# Patient Record
Sex: MALE | Race: OTHER | HISPANIC OR LATINO | Employment: FULL TIME | URBAN - METROPOLITAN AREA
[De-identification: names, ages, dates, MRNs, and addresses within clinical notes are randomized per-mention and may not be internally consistent; named-entity substitution may affect disease eponyms.]

---

## 2024-08-11 ENCOUNTER — HOSPITAL ENCOUNTER (EMERGENCY)
Facility: HOSPITAL | Age: 39
Discharge: HOME | End: 2024-08-11
Attending: EMERGENCY MEDICINE

## 2024-08-11 VITALS
OXYGEN SATURATION: 97 % | BODY MASS INDEX: 24.81 KG/M2 | HEART RATE: 72 BPM | HEIGHT: 70 IN | WEIGHT: 173.31 LBS | RESPIRATION RATE: 18 BRPM | DIASTOLIC BLOOD PRESSURE: 74 MMHG | SYSTOLIC BLOOD PRESSURE: 125 MMHG | TEMPERATURE: 99 F

## 2024-08-11 DIAGNOSIS — G43.909 MIGRAINE WITHOUT STATUS MIGRAINOSUS, NOT INTRACTABLE, UNSPECIFIED MIGRAINE TYPE: Primary | ICD-10-CM

## 2024-08-11 LAB
CTP QC/QA: YES
CTP QC/QA: YES
MOLECULAR STREP A: NEGATIVE
POCT GLUCOSE: 160 MG/DL (ref 70–110)
SARS-COV-2 RDRP RESP QL NAA+PROBE: NEGATIVE

## 2024-08-11 PROCEDURE — 82962 GLUCOSE BLOOD TEST: CPT

## 2024-08-11 PROCEDURE — 63600175 PHARM REV CODE 636 W HCPCS

## 2024-08-11 PROCEDURE — 87651 STREP A DNA AMP PROBE: CPT

## 2024-08-11 PROCEDURE — 87635 SARS-COV-2 COVID-19 AMP PRB: CPT

## 2024-08-11 PROCEDURE — 96372 THER/PROPH/DIAG INJ SC/IM: CPT

## 2024-08-11 PROCEDURE — 99285 EMERGENCY DEPT VISIT HI MDM: CPT | Mod: 25

## 2024-08-11 RX ORDER — IBUPROFEN 600 MG/1
600 TABLET ORAL EVERY 6 HOURS PRN
Qty: 20 TABLET | Refills: 0 | Status: SHIPPED | OUTPATIENT
Start: 2024-08-11

## 2024-08-11 RX ORDER — BUTALBITAL, ACETAMINOPHEN AND CAFFEINE 50; 325; 40 MG/1; MG/1; MG/1
1 TABLET ORAL EVERY 4 HOURS PRN
Qty: 15 TABLET | Refills: 0 | Status: SHIPPED | OUTPATIENT
Start: 2024-08-11 | End: 2024-09-10

## 2024-08-11 RX ORDER — KETOROLAC TROMETHAMINE 30 MG/ML
30 INJECTION, SOLUTION INTRAMUSCULAR; INTRAVENOUS
Status: COMPLETED | OUTPATIENT
Start: 2024-08-11 | End: 2024-08-11

## 2024-08-11 RX ADMIN — KETOROLAC TROMETHAMINE 30 MG: 30 INJECTION, SOLUTION INTRAMUSCULAR at 05:08

## 2024-08-11 NOTE — ED PROVIDER NOTES
Encounter Date: 8/11/2024    SCRIBE #1 NOTE: I, Srinajma Layne, am scribing for, and in the presence of,  Tomi Moss PA-C.       History     Chief Complaint   Patient presents with    Headache     HA for 2 days, fatigue, body aches, nausea without vomiting.  in triage.      Shlomo Fairbanks is a 38 y.o. male, with a PMHx of DM, who presents to the ED with a severe headache that began yesterday while working out. Patient reports he was doing arm exercises when he suddenly began with a 10/10 posterior headache that radiates behind his eyes. Reports pain has progressively gotten better since yesterday. Reports attempted Tx with 1000mg tylenol at 0900 today w/ some mild relief. Reports he has been fatigued all day today and was nauseous earlier today. Reports he works long hours outside. Denies Hx of similar headaches.  No other exacerbating or alleviating factors. Denies visual disturbances, vomiting, abdominal pain, numbness, weakness, cough, congestion, rhinorrhea or other associated symptoms.       The history is provided by the patient. No  was used.     Review of patient's allergies indicates:  No Known Allergies  History reviewed. No pertinent past medical history.  History reviewed. No pertinent surgical history.  No family history on file.  Social History     Tobacco Use    Smoking status: Never    Smokeless tobacco: Never   Substance Use Topics    Alcohol use: Yes    Drug use: Never     Review of Systems   Constitutional:  Positive for fatigue. Negative for activity change, chills, diaphoresis and fever.   HENT:  Negative for congestion, drooling, ear pain, rhinorrhea, sneezing, sore throat and trouble swallowing.    Eyes:  Negative for pain and visual disturbance.   Respiratory:  Negative for cough, chest tightness, shortness of breath, wheezing and stridor.    Cardiovascular:  Negative for chest pain, palpitations and leg swelling.   Gastrointestinal:  Positive for  nausea. Negative for abdominal distention, abdominal pain, constipation, diarrhea and vomiting.   Genitourinary:  Negative for difficulty urinating, dysuria, frequency and urgency.   Musculoskeletal:  Negative for arthralgias, back pain, myalgias, neck pain and neck stiffness.   Skin:  Negative for pallor, rash and wound.   Neurological:  Positive for headaches. Negative for dizziness, syncope, weakness, light-headedness and numbness.   All other systems reviewed and are negative.      Physical Exam     Initial Vitals [08/11/24 1538]   BP Pulse Resp Temp SpO2   131/81 85 18 98.2 °F (36.8 °C) 98 %      MAP       --         Physical Exam    Nursing note and vitals reviewed.  Constitutional: He appears well-developed and well-nourished. He is not diaphoretic. He does not appear ill. No distress.   HENT:   Head: Normocephalic and atraumatic.   Right Ear: External ear normal.   Left Ear: External ear normal.   Nose: Nose normal.   Mouth/Throat: Uvula is midline and oropharynx is clear and moist.   Eyes: Conjunctivae and EOM are normal. Pupils are equal, round, and reactive to light. Right eye exhibits no discharge. Left eye exhibits no discharge. No scleral icterus.   Neck: Trachea normal. Neck supple.   Normal range of motion.   Full passive range of motion without pain.     Cardiovascular:  Normal rate, regular rhythm, normal heart sounds, intact distal pulses and normal pulses.     Exam reveals no distant heart sounds and no friction rub.       Pulmonary/Chest: Effort normal and breath sounds normal. No respiratory distress.   Abdominal: Abdomen is soft. Bowel sounds are normal. He exhibits no distension and no pulsatile midline mass. There is no abdominal tenderness.   No right CVA tenderness.  No left CVA tenderness. There is no rebound and no guarding.   Musculoskeletal:         General: Normal range of motion.      Right shoulder: Normal.      Left shoulder: Normal.      Right elbow: Normal.      Left elbow:  Normal.      Right wrist: Normal.      Left wrist: Normal.      Right hand: Normal.      Left hand: Normal.      Cervical back: Normal, full passive range of motion without pain, normal range of motion and neck supple. No edema, erythema or rigidity. No spinous process tenderness or muscular tenderness. Normal range of motion.      Thoracic back: Normal.      Lumbar back: Normal.      Right hip: Normal.      Left hip: Normal.      Right knee: Normal.      Left knee: Normal.      Right lower leg: Normal.      Left lower leg: Normal.      Right ankle: Normal.      Left ankle: Normal.      Right foot: Normal.      Left foot: Normal.     Neurological: He is alert and oriented to person, place, and time. He has normal strength. No cranial nerve deficit or sensory deficit. He displays a negative Romberg sign. Coordination and gait normal. GCS eye subscore is 4. GCS verbal subscore is 5. GCS motor subscore is 6.   He is awake, alert, and oriented x3. PERRL. EOM's Intact. Gross visual acuity is intact. No tongue deviation or slurred speech. Bilateral facial movement is symmetrical. Symmetrical shoulder shrug and head moves appropriately side to side. Sensation is intact and symmetric to face, upper, and lower extremities. Patient hears commands and appropriately responds. Strength is 5/5 UE/LE bilaterally. No truncal ataxia. Ambulates with a steady gait.  Normal finger-to-nose.  Cervical Nerve Exam Normal: Equal strength with upper extremities (thumbs up/down/side, fingers spread, wrist and elbow flexion/extension, arms crossed).      Skin: Skin is warm and dry. Capillary refill takes less than 2 seconds. No bruising, no ecchymosis and no rash noted. No erythema.   Psychiatric: He has a normal mood and affect. His speech is normal and behavior is normal. Thought content normal.         ED Course   Procedures  Labs Reviewed   POCT GLUCOSE - Abnormal       Result Value    POCT Glucose 160 (*)    SARS-COV-2 RDRP GENE    POC  Rapid COVID Negative       Acceptable Yes     POCT STREP A MOLECULAR          Imaging Results              CT Head Without Contrast (Final result)  Result time 08/11/24 16:50:00      Final result by Thierry Bui MD (08/11/24 16:50:00)                   Impression:      No acute intracranial process.      Electronically signed by: Thierry Bui  Date:    08/11/2024  Time:    16:50               Narrative:    EXAMINATION:  CT HEAD WITHOUT CONTRAST    CLINICAL HISTORY:  Headache, sudden, severe;    TECHNIQUE:  Low dose axial CT images obtained throughout the head without intravenous contrast. Sagittal and coronal reconstructions were performed.    COMPARISON:  None.    FINDINGS:  Intracranial compartment:    Ventricles and sulci are normal in size for age without evidence of hydrocephalus. No extra-axial blood or fluid collections.    The brain parenchyma appears normal. No parenchymal mass, hemorrhage, edema or major vascular distribution infarct.    Skull/extracranial contents (limited evaluation): No fracture. Mastoid air cells and paranasal sinuses are essentially clear.                                       Medications   ketorolac injection 30 mg (30 mg Intramuscular Given 8/11/24 1709)     Medical Decision Making  Shlomo Fairbanks is a 38 y.o. male, with a PMHx of DM, who presents to the ED with a severe headache that began yesterday while working out. Patient reports he was doing arm exercises when he suddenly began with a 10/10 posterior headache that radiates behind his eyes. Reports pain has progressively gotten better since yesterday. Reports attempted Tx with 1000mg tylenol at 0900 today w/ some mild relief. Reports he has been fatigued all day today and was nauseous earlier today. Reports he works long hours outside. Denies Hx of similar headaches.  No other exacerbating or alleviating factors. Denies visual disturbances, vomiting, abdominal pain, numbness, weakness, cough,  congestion, rhinorrhea or other associated symptoms.   Patient's chart and medical history reviewed.  Patient's vitals reviewed.  They are afebrile, no respiratory distress, nontoxic-appearing in the ED.  Differential diagnosis is considered   - SAH, Epidural hematoma, Subdural hematoma: considered with patient having severe headache onset.   - Meningitis: considered with headache, although unlikely with no fever, neck stiffness, negative Brudzinski  - Pseudotumor Cerebri: considered with headache, although unlikely without risk factors. Will re-evaluate headache after medications.  - Migraine, Tension headache, Cluster Headache: considered with headache with photosensitivity   - GCA: considered with headache, although unlikely with no significant risk factors, no temporal tenderness  CT head ordered for evaluation due to severe headache. More per ED course. Patient given Toradol for pain. Plan to dc home with NSAIDs and fioricet if headache returns. Patient currently endorses headache starting to resolve at 3/10.    At this time I'll discharge home to follow up with primary care physician in the next 1-2 days for further evaluation.  If the pain continues the pt will need to see Neurology for further evaluation.  The patient is comfortable with this plan and comfortable going home at this time. After taking into careful account the historical factors and physical exam findings of the patient's presentation today, in conjunction with the empirical and objective data obtained on ED workup, no acute emergent medical condition has been identified. The patient appears to be low risk for an emergent medical condition and I feel it is safe and appropriate at this time for the patient to be discharged to follow-up as detailed in their discharge instructions for reevaluation and possible continued outpatient workup and management. I have discussed the specifics of the workup with the patient and the patient has verbalized  understanding of the details of the workup, the diagnosis, the treatment plan, and the need for outpatient follow-up.  Although the patient has no emergent etiology today this does not preclude the development of an emergent condition so in addition, I have advised the patient that they can return to the ED and/or activate EMS at any time with worsening of their symptoms, change of their symptoms, or with any other medical complaint.  The patient remained comfortable and stable during their visit in the ED.  Discharge and follow-up instructions discussed with the patient who expressed understanding and willingness to comply with my recommendations. I discussed with the patient/family the diagnosis, treatment plan, indications for return to the emergency department, and for expected follow-up. Please follow up with your primary doctor in 1-2 days and return to the ED in any new, worsening, or continued symptoms. The patient/family verbalized an understanding. The patient/family is asked if there are any questions or concerns. We discuss the case, until all issues are addressed to the patient/family's satisfaction. Patient/family understands and is agreeable to the plan.   DANIA SILVA PA-C    DISCLAIMER: This note was prepared with [employer] voice recognition transcription software. Garbled syntax, mangled pronouns, and other bizarre constructions may be attributed to that software system.      Amount and/or Complexity of Data Reviewed  Labs: ordered.  Radiology: ordered and independent interpretation performed.    Risk  Prescription drug management.            Scribe Attestation:   Scribe #1: I performed the above scribed service and the documentation accurately describes the services I performed. I attest to the accuracy of the note.        ED Course as of 08/11/24 1713   Sun Aug 11, 2024   1701 No intracranial abnormalities on CT. [AF]   1703 SpO2: 98 % [AF]   1703 Pulse: 85 [AF]   1703 Temp: 98.2 °F (36.8 °C)  [AF]   1703 BP: 131/81 [AF]   1703 POCT Glucose(!): 160 [AF]   1703 SARS-CoV-2 RNA, Amplification, Qual: Negative [AF]      ED Course User Index  [AF] Tomi Moss PA-C I, Alain David Flexer, PA-C, personally performed the services described in this documentation. All medical record entries made by the scribe were at my direction and in my presence. I have reviewed the chart and agree that the record reflects my personal performance and is accurate and complete.      DISCLAIMER: This note was prepared with [employer] voice recognition transcription software. Garbled syntax, mangled pronouns, and other bizarre constructions may be attributed to that software system.        Clinical Impression:  Final diagnoses:  [G43.909] Migraine without status migrainosus, not intractable, unspecified migraine type (Primary)          ED Disposition Condition    Discharge Stable          ED Prescriptions       Medication Sig Dispense Start Date End Date Auth. Provider    butalbital-acetaminophen-caffeine -40 mg (FIORICET, ESGIC) -40 mg per tablet Take 1 tablet by mouth every 4 (four) hours as needed. 15 tablet 8/11/2024 9/10/2024 Tomi Moss PA-C    ibuprofen (ADVIL,MOTRIN) 600 MG tablet Take 1 tablet (600 mg total) by mouth every 6 (six) hours as needed for Pain. 20 tablet 8/11/2024 -- Tomi Moss PA-C          Follow-up Information       Follow up With Specialties Details Why Contact Info    [facility] -  Schedule an appointment as soon as possible for a visit in 1 day for follow up if you do not currently have a  [address]  [place] [address]  [phone]      Your primary care physician  Schedule an appointment as soon as possible for a visit in 1 day for follow up     [facility]UROLOGY Neurology Schedule an appointment as soon as possible for a visit  If symptoms worsen, for follow up [address] [facility]  [place]  [place] [address]  [phone]             Tomi Moss PA-C  08/11/24 1712

## 2024-08-11 NOTE — DISCHARGE INSTRUCTIONS
Thank you for coming to our Emergency Department today. It is important to remember that some problems or medical conditions are difficult to diagnose and may not be found or addressed during your Emergency Department visit.  These conditions often start with non-specific symptoms and can only be diagnosed on follow up visits with your primary care physician or specialist when the symptoms continue or change. Please remember that all medical conditions can change, and we cannot predict how you will be feeling tomorrow or the next day. Return to the ER with any questions/concerns, new/concerning symptoms including fever, chest pain, shortness of breath, loss of consciousness, dizziness, weakness, worsening symptoms, failure to improve, or any other concerns. Also, please follow up with your Primary Care Physician and/or Pediatrician in the next 1-2 days to review your ED visit in entirety and for re-evaluation.   Be sure to follow up with your primary care doctor and review all labs/imaging/tests that were performed during your ER visit with them. It is very common for us to identify non-emergent incidental findings which must be followed up with your primary care physician.  Some labs/imaging/tests may be outside of the normal range, and require non-emergent follow-up and/or further investigation/treatment/procedures/testing to help diagnose/exclude/prevent complications or other potentially serious medical conditions. Some abnormalities may not have been discussed or addressed during your ER visit. Some lab results may not return during your ER visit but can be accessible by downloading the free [facility] susan or by visiting [email or web address] . It is important for you to review all labs/imaging/tests which are outside of the normal range with your physician.  An ER visit does not replace a primary care visit, and many screening tests or follow-up tests cannot be ordered by an ER doctor or performed by  the ER. Some tests may even require pre-approval.  If you do not have a primary care doctor, you may contact the one listed on your discharge paperwork or you may also call the [facility] Appointment Desk at [phone] , or [address] [place] [address] at  [phone] to schedule an appointment, or establish care with a primary care doctor or even a specialist and to obtain information about local resources. It is important to your health that you have a primary care doctor.  Please take all medications as directed. We have done our best to select a medication for you that will treat your condition however, all medications may potentially have side-effects and it is impossible to predict which medications may give you side-effects or what those side-effects (if any) those medications may give you.  If you feel that you are having a negative effect or side-effect of any medication you should stop taking those medications immediately and seek medical attention. If you feel that you are having a life-threatening reaction call 911.  Do not drive, swim, climb to height, take a bath, operate heavy machinery, drink alcohol or take potentially sedating medications, sign any legal documents or make any important decisions for 24 hours if you have received any pain medications, sedatives or mood altering drugs during your ER visit or within 24 hours of taking them if they have been prescribed to you.   You can find additional resources for Dentists, hearing aids, durable medical equipment, low cost pharmacies and other resources at [email or web address]  Patient agrees with this plan. Discussed with her strict return precautions, they verbalized understanding. Patient is stable for discharge.   § Please take all medication as prescribed.

## 2024-08-11 NOTE — ED TRIAGE NOTES
Pt presents to ER with complaints of HA times 2 days. Pt rates pain 3/10 and says he took Tylenol today. Pt admits to nausea but denies vomiting. Pt reports fatigue as well. Pt denies any other issues at this time.

## 2024-09-04 ENCOUNTER — HOSPITAL ENCOUNTER (EMERGENCY)
Facility: HOSPITAL | Age: 39
Discharge: HOME | End: 2024-09-04
Attending: EMERGENCY MEDICINE

## 2024-09-04 VITALS
TEMPERATURE: 98 F | BODY MASS INDEX: 24.05 KG/M2 | SYSTOLIC BLOOD PRESSURE: 115 MMHG | WEIGHT: 168 LBS | OXYGEN SATURATION: 100 % | HEART RATE: 65 BPM | DIASTOLIC BLOOD PRESSURE: 61 MMHG | HEIGHT: 70 IN | RESPIRATION RATE: 18 BRPM

## 2024-09-04 DIAGNOSIS — R73.9 ELEVATED BLOOD SUGAR: Primary | ICD-10-CM

## 2024-09-04 DIAGNOSIS — F41.9 ANXIETY: ICD-10-CM

## 2024-09-04 DIAGNOSIS — H93.8X1 EAR FULLNESS, RIGHT: ICD-10-CM

## 2024-09-04 LAB
ALBUMIN SERPL BCP-MCNC: 4.2 G/DL (ref 3.5–5.2)
ALP SERPL-CCNC: 75 U/L (ref 55–135)
ALT SERPL W/O P-5'-P-CCNC: 11 U/L (ref 10–44)
ANION GAP SERPL CALC-SCNC: 7 MMOL/L (ref 8–16)
AST SERPL-CCNC: 24 U/L (ref 10–40)
B-OH-BUTYR BLD STRIP-SCNC: 0.2 MMOL/L (ref 0–0.5)
BACTERIA #/AREA URNS HPF: NORMAL /HPF
BASOPHILS # BLD AUTO: 0.05 K/UL (ref 0–0.2)
BASOPHILS NFR BLD: 0.6 % (ref 0–1.9)
BILIRUB SERPL-MCNC: 0.4 MG/DL (ref 0.1–1)
BILIRUB UR QL STRIP: NEGATIVE
BUN SERPL-MCNC: 19 MG/DL (ref 6–20)
CALCIUM SERPL-MCNC: 9.2 MG/DL (ref 8.7–10.5)
CHLORIDE SERPL-SCNC: 105 MMOL/L (ref 95–110)
CLARITY UR: CLEAR
CO2 SERPL-SCNC: 25 MMOL/L (ref 23–29)
COLOR UR: YELLOW
CREAT SERPL-MCNC: 1 MG/DL (ref 0.5–1.4)
DIFFERENTIAL METHOD BLD: ABNORMAL
EOSINOPHIL # BLD AUTO: 0.3 K/UL (ref 0–0.5)
EOSINOPHIL NFR BLD: 4.1 % (ref 0–8)
ERYTHROCYTE [DISTWIDTH] IN BLOOD BY AUTOMATED COUNT: 12.5 % (ref 11.5–14.5)
EST. GFR  (NO RACE VARIABLE): >60 ML/MIN/1.73 M^2
GLUCOSE SERPL-MCNC: 135 MG/DL (ref 70–110)
GLUCOSE SERPL-MCNC: 206 MG/DL (ref 70–110)
GLUCOSE UR QL STRIP: ABNORMAL
HCT VFR BLD AUTO: 44.8 % (ref 40–54)
HGB BLD-MCNC: 15.6 G/DL (ref 14–18)
HGB UR QL STRIP: NEGATIVE
IMM GRANULOCYTES # BLD AUTO: 0.01 K/UL (ref 0–0.04)
IMM GRANULOCYTES NFR BLD AUTO: 0.1 % (ref 0–0.5)
KETONES UR QL STRIP: NEGATIVE
LEUKOCYTE ESTERASE UR QL STRIP: NEGATIVE
LYMPHOCYTES # BLD AUTO: 2.6 K/UL (ref 1–4.8)
LYMPHOCYTES NFR BLD: 32 % (ref 18–48)
MAGNESIUM SERPL-MCNC: 1.9 MG/DL (ref 1.6–2.6)
MCH RBC QN AUTO: 31.6 PG (ref 27–31)
MCHC RBC AUTO-ENTMCNC: 34.8 G/DL (ref 32–36)
MCV RBC AUTO: 91 FL (ref 82–98)
MICROSCOPIC COMMENT: NORMAL
MONOCYTES # BLD AUTO: 0.6 K/UL (ref 0.3–1)
MONOCYTES NFR BLD: 7.7 % (ref 4–15)
NEUTROPHILS # BLD AUTO: 4.6 K/UL (ref 1.8–7.7)
NEUTROPHILS NFR BLD: 55.5 % (ref 38–73)
NITRITE UR QL STRIP: NEGATIVE
NRBC BLD-RTO: 0 /100 WBC
PH UR STRIP: 6 [PH] (ref 5–8)
PLATELET # BLD AUTO: 217 K/UL (ref 150–450)
PMV BLD AUTO: 11.2 FL (ref 9.2–12.9)
POCT GLUCOSE: 135 MG/DL (ref 70–110)
POCT GLUCOSE: 255 MG/DL (ref 70–110)
POTASSIUM SERPL-SCNC: 4 MMOL/L (ref 3.5–5.1)
PROT SERPL-MCNC: 7.7 G/DL (ref 6–8.4)
PROT UR QL STRIP: NEGATIVE
RBC # BLD AUTO: 4.94 M/UL (ref 4.6–6.2)
RBC #/AREA URNS HPF: 0 /HPF (ref 0–4)
SODIUM SERPL-SCNC: 137 MMOL/L (ref 136–145)
SP GR UR STRIP: 1.01 (ref 1–1.03)
SQUAMOUS #/AREA URNS HPF: 0 /HPF
URN SPEC COLLECT METH UR: ABNORMAL
UROBILINOGEN UR STRIP-ACNC: NEGATIVE EU/DL
WBC # BLD AUTO: 8.23 K/UL (ref 3.9–12.7)
WBC #/AREA URNS HPF: 0 /HPF (ref 0–5)
YEAST URNS QL MICRO: NORMAL

## 2024-09-04 PROCEDURE — 82962 GLUCOSE BLOOD TEST: CPT

## 2024-09-04 PROCEDURE — 80053 COMPREHEN METABOLIC PANEL: CPT

## 2024-09-04 PROCEDURE — 25000003 PHARM REV CODE 250

## 2024-09-04 PROCEDURE — 85025 COMPLETE CBC W/AUTO DIFF WBC: CPT

## 2024-09-04 PROCEDURE — 82010 KETONE BODYS QUAN: CPT

## 2024-09-04 PROCEDURE — 99284 EMERGENCY DEPT VISIT MOD MDM: CPT | Mod: 25

## 2024-09-04 PROCEDURE — 81000 URINALYSIS NONAUTO W/SCOPE: CPT

## 2024-09-04 PROCEDURE — 83735 ASSAY OF MAGNESIUM: CPT

## 2024-09-04 PROCEDURE — 96360 HYDRATION IV INFUSION INIT: CPT

## 2024-09-04 RX ORDER — FLUTICASONE PROPIONATE 50 MCG
1 SPRAY, SUSPENSION (ML) NASAL 2 TIMES DAILY PRN
Qty: 15 G | Refills: 0 | Status: SHIPPED | OUTPATIENT
Start: 2024-09-04

## 2024-09-04 RX ORDER — CETIRIZINE HYDROCHLORIDE 10 MG/1
10 TABLET ORAL DAILY
Qty: 30 TABLET | Refills: 0 | Status: SHIPPED | OUTPATIENT
Start: 2024-09-04 | End: 2024-10-04

## 2024-09-04 RX ORDER — LANCETS
EACH MISCELLANEOUS
Qty: 100 EACH | Refills: 0 | Status: SHIPPED | OUTPATIENT
Start: 2024-09-04

## 2024-09-04 RX ORDER — INSULIN PUMP SYRINGE, 3 ML
EACH MISCELLANEOUS
Qty: 1 EACH | Refills: 0 | Status: SHIPPED | OUTPATIENT
Start: 2024-09-04 | End: 2025-09-04

## 2024-09-04 RX ADMIN — SODIUM CHLORIDE 1000 ML: 9 INJECTION, SOLUTION INTRAVENOUS at 03:09

## 2024-09-04 NOTE — Clinical Note
"Shlomo"Shlomo"SaimaFairbanks was seen and treated in our emergency department on 9/4/2024.  He may return to work on 09/05/2024.       If you have any questions or concerns, please don't hesitate to call.      Debora Aguirre PA-C"

## 2024-09-04 NOTE — DISCHARGE INSTRUCTIONS
Continue to monitor your blood sugars.  Be sure getting adequate rest and hydration.  Follow up with your PCP for further evaluation management of her symptoms.    Thank you for coming to our Emergency Department today. It is important to remember that some problems or medical conditions are difficult to diagnose and may not be found or addressed during your Emergency Department visit.  These conditions often start with non-specific symptoms and can only be diagnosed on follow up visits with your primary care physician or specialist when the symptoms continue or change. Please remember that all medical conditions can change, and we cannot predict how you will be feeling tomorrow or the next day. Return to the ER with any questions/concerns, new/concerning symptoms, worsening or failure to improve.     Please return to ER if you experience severe dizziness, fever higher then 100.4 that persist after medication administration, uncontrolled nausea/vomiting or diarrhea or any other major concern like increased pain, chest pain, shortness of breath, inability to pass stool or gas, or difficulty breathing or swelling of the throat/mouth/tongue.    Be sure to follow up with your primary care doctor and review all labs/imaging/tests that were performed during your ER visit with them. It is very common for us to identify non-emergent incidental findings which must be followed up with your primary care physician.  Some labs/imaging/tests may be outside of the normal range, and require non-emergent follow-up and/or further investigation/treatment/procedures/testing to help diagnose/exclude/prevent complications or other potentially serious medical conditions. Some abnormalities may not have been discussed or addressed during your ER visit.     An ER visit does not replace a primary care visit, and many screening tests or follow-up tests cannot be ordered by an ER doctor or performed by the ER. Some tests may even require  pre-approval.    If you do not have a primary care doctor, you may contact the one listed on your discharge paperwork or you may also call the [facility] Appointment Desk at [phone] , or [address] [place] [address] at  [phone] to schedule an appointment, or establish care with a primary care doctor or even a specialist and to obtain information about local resources. It is important to your health that you have a primary care doctor.    Please take all medications as directed. We have done our best to select a medication for you that will treat your condition however, all medications may potentially have side-effects and it is impossible to predict which medications may give you side-effects or what those side-effects (if any) those medications may give you.  If you feel that you are having a negative effect or side-effect of any medication you should stop taking those medications immediately and seek medical attention. If you feel that you are having a life-threatening reaction call 911.      Do not drive, swim, climb to height, take a bath, operate heavy machinery, drink alcohol or take potentially sedating medications, sign any legal documents or make any important decisions for 24 hours if you have received any pain medications, sedatives or mood altering drugs during your ER visit or within 24 hours of taking them if they have been prescribed to you.     You can find additional resources for Dentists, hearing aids, durable medical equipment, low cost pharmacies and other resources at [email or web address]

## 2024-09-05 NOTE — ED PROVIDER NOTES
Encounter Date: 9/4/2024       History     Chief Complaint   Patient presents with    ear muffled    Shaking     Pt presents to ER with complaints of feeling shakey and his right ear is muffled since 0900. Pt states he is diabetic and says he took 2 metformin earlier. Pt states he just doesn't feel well. Pt denies any other issues at this time. Blood glucose 255     38 y.o. male with PMHx of DM, presents for emergent evaluation of intermittent right ear pain X  month worsened today now reporting muffled sensation.  He also reports feeling shaky and that his blood sugar was slightly elevated. Patient works outside as a  in the heat and was not feeling his normal self and overall weak and shaky.  He has a dexcom in place to upper arm which was reading his blood sugar to be 240 so he took 2 metformin pills.  He states that he is on weekly Ozempic which he takes as prescribed and is told to take one metformin p.r.n. for blood sugars >180.  He took 2 because he was really not feeling well.  He then went to the health office where his glucose was checked with fingerstick and noted to be 180s.  He it was lunch which consisted of 2 hot dogs and shakiness improved but his ear pain persisted prompting her to report to the ER.  He states that he is from [place] where his PCP resides in his here short term for work.  His last blood work was completed in May by his PCP.  He has not had his Dexcom interrogated in awhile so he believes it could be an accurate as well. He reports that he manages his blood sugars well and follows a diet very strictly eating low car blood sugar frequent meals and stays very hydrated throughout the day.  He has not attempted any treatment for his ear.  Denies any URI symptoms, fever/chills, headache, chest pain, shortness of breath, abdominal pain, nausea/vomiting/diarrhea, urinary concerns, myalgias, or any other complaints at this time.     The history is provided by the patient.      Review of patient's allergies indicates:  No Known Allergies  History reviewed. No pertinent past medical history.  History reviewed. No pertinent surgical history.  No family history on file.  Social History     Tobacco Use    Smoking status: Never    Smokeless tobacco: Never   Substance Use Topics    Alcohol use: Yes    Drug use: Never     Review of Systems   Constitutional:  Negative for chills and fever.   HENT:  Positive for ear pain. Negative for congestion, ear discharge, postnasal drip, rhinorrhea and sore throat.    Respiratory:  Negative for shortness of breath.    Cardiovascular:  Negative for chest pain.   Gastrointestinal:  Negative for abdominal pain, diarrhea, nausea and vomiting.   Genitourinary:  Negative for decreased urine volume, dysuria, hematuria and testicular pain.   Musculoskeletal:  Negative for myalgias.   Skin:  Negative for rash.   Neurological:  Positive for weakness. Negative for syncope, light-headedness and headaches.   Psychiatric/Behavioral: Negative.         Physical Exam     Initial Vitals [09/04/24 1427]   BP Pulse Resp Temp SpO2   130/76 77 18 98.1 °F (36.7 °C) 98 %      MAP       --         Physical Exam    Nursing note and vitals reviewed.  Constitutional: He appears well-developed and well-nourished. He is not diaphoretic. No distress.   HENT:   Head: Normocephalic and atraumatic.   Right Ear: External ear normal.   Left Ear: External ear normal.   Mouth/Throat: Oropharynx is clear and moist.   Eyes: Conjunctivae and EOM are normal. No scleral icterus.   Neck: Neck supple.   Normal range of motion.  Cardiovascular:  Normal rate and regular rhythm.           Pulmonary/Chest: No stridor. No respiratory distress.   Abdominal: Abdomen is soft. Bowel sounds are normal. He exhibits no distension. There is no abdominal tenderness. There is no rebound.   Musculoskeletal:         General: Normal range of motion.      Cervical back: Normal range of motion and neck supple.      Neurological: He is alert and oriented to person, place, and time. He has normal strength.   Skin: No rash noted.   Psychiatric: He has a normal mood and affect. Thought content normal.         ED Course   Procedures  Labs Reviewed   URINALYSIS, REFLEX TO URINE CULTURE - Abnormal       Result Value    Specimen UA Urine, Clean Catch      Color, UA Yellow      Appearance, UA Clear      pH, UA 6.0      Specific Gravity, UA 1.015      Protein, UA Negative      Glucose, UA 3+ (*)     Ketones, UA Negative      Bilirubin (UA) Negative      Occult Blood UA Negative      Nitrite, UA Negative      Urobilinogen, UA Negative      Leukocytes, UA Negative      Narrative:     Specimen Source->Urine   CBC W/ AUTO DIFFERENTIAL - Abnormal    WBC 8.23      RBC 4.94      Hemoglobin 15.6      Hematocrit 44.8      MCV 91      MCH 31.6 (*)     MCHC 34.8      RDW 12.5      Platelets 217      MPV 11.2      Immature Granulocytes 0.1      Gran # (ANC) 4.6      Immature Grans (Abs) 0.01      Lymph # 2.6      Mono # 0.6      Eos # 0.3      Baso # 0.05      nRBC 0      Gran % 55.5      Lymph % 32.0      Mono % 7.7      Eosinophil % 4.1      Basophil % 0.6      Differential Method Automated     COMPREHENSIVE METABOLIC PANEL - Abnormal    Sodium 137      Potassium 4.0      Chloride 105      CO2 25      Glucose 206 (*)     BUN 19      Creatinine 1.0      Calcium 9.2      Total Protein 7.7      Albumin 4.2      Total Bilirubin 0.4      Alkaline Phosphatase 75      AST 24      ALT 11      eGFR >60      Anion Gap 7 (*)    POCT GLUCOSE - Abnormal    POCT Glucose 255 (*)    POCT GLUCOSE - Abnormal    POCT Glucose 135 (*)    MAGNESIUM    Magnesium 1.9     BETA - HYDROXYBUTYRATE, SERUM    Beta-Hydroxybutyrate 0.2     URINALYSIS MICROSCOPIC    RBC, UA 0      WBC, UA 0      Bacteria None      Yeast, UA None      Squam Epithel, UA 0      Microscopic Comment SEE COMMENT      Narrative:     Specimen Source->Urine          Imaging Results    None           Medications   sodium chloride 0.9% bolus 1,000 mL 1,000 mL (0 mLs Intravenous Stopped 9/4/24 8940)     Medical Decision Making  This is an evaluation of a 38 y.o. male with PMH DM that presents to the Emergency Department for right ear pain and concern for elevated blood sugar. The patient is a non-toxic, afebrile, and well appearing male. On physical exam ears and pharynx are without evidence of infection. Appears well hydrated with moist mucus membranes. Neck soft and supple with no meningeal signs or cervical lymphadenopathy. Breath sounds are clear and equal bilaterally with no adventitious breath sounds, tachypnea or respiratory distress with room air pulse ox of 98% and no evidence of hypoxia.     Vital Signs Are Reassuring.  RESULTS: POCT glucose 255. UA with no evidence of infection, protein or ketones. CBC without leukocytosis.  Hemoglobin and hematocrit stable. CMP unremarkable without significant electrolyte derangement, impaired renal function, or elevated LFTs.  Beta hydroxybutyrate within normal limits.  Given 1L NS.  Repeat glucose 135.     My overall impression is acute serous OM.  Advised over-the-counter medications and decongestants for symptomatic care. I considered, but at this time, do not suspect OM, OE, PTA, strep pharyngitis, retropharyngeal abscess, isidro angina, meningitis, pneumonia, or acute bacterial sinusitis.   All findings were reviewed with the patient/family in detail.  Although the patient is experiencing some hyperglycemia, I see no indication of diabetic ketoacidosis or hyperosmotic hyperkeratotic syndrome.  Patient is feeling well at this time  I see no indication of an emergent process beyond that addressed during our encounter but have duly counseled the patient/family regarding the need for prompt follow-up as well as the indications that should prompt immediate return to the emergency room should new or worrisome developments occur.  His Dexcom could be inaccurate.  Will  write orders for fingerstick glucose monitor.  Advised to continue all prescribed medications and follow up with his PCP.  Emphasized importance of hydration as well as appropriate diabetic diet.     The diagnosis, treatment plan, instructions for follow-up and reevaluation with PCP as well as ED return precautions were discussed and understanding was verbalized. All questions or concerns have been addressed.     Amount and/or Complexity of Data Reviewed  Labs: ordered. Decision-making details documented in ED Course.    Risk  OTC drugs.  Diagnosis or treatment significantly limited by social determinants of health.               ED Course as of 09/05/24 1250   Wed Sep 04, 2024   1708 Urinalysis Microscopic  UA is negative for infection, no nitrites, leukocytes, blood, or protein present. [CC]   1708 Magnesium : 1.9 [CC]   1708 Beta-Hydroxybutyrate: 0.2 [CC]   1708 CBC auto differential(!) [CC]   1708 Comprehensive metabolic panel(!) [CC]   1708 CBC without leukocytosis.  Hemoglobin and hematocrit stable. CMP unremarkable without significant electrolyte derangement, impaired renal function, or elevated LFTs  [CC]      ED Course User Index  [CC] Debora Aguirre PA-C                           Clinical Impression:  Final diagnoses:  [R73.9] Elevated blood sugar (Primary)  [H93.8X1] Ear fullness, right  [F41.9] Anxiety          ED Disposition Condition    Discharge Stable          ED Prescriptions       Medication Sig Dispense Start Date End Date Auth. Provider    blood-glucose meter kit To check BG 3 times daily, to use with insurance preferred meter 1 each 9/4/2024 9/4/2025 Debora Aguirre PA-C    lancets Misc To check BG 3 times daily, to use with insurance preferred meter 100 each 9/4/2024 -- Debora Aguirre PA-C    blood sugar diagnostic Strp To check BG 3 times daily, to use with insurance preferred meter 100 each 9/4/2024 -- Debora Aguirre PA-C    fluticasone propionate (FLONASE) 50 mcg/actuation nasal  spray 1 spray (50 mcg total) by Each Nostril route 2 (two) times daily as needed for Rhinitis or Allergies. 15 g 9/4/2024 -- Debora Aguirre PA-C    cetirizine (ZYRTEC) 10 MG tablet Take 1 tablet (10 mg total) by mouth once daily. 30 tablet 9/4/2024 10/4/2024 Debora Aguirre PA-C          Follow-up Information       Follow up With Specialties Details Why Contact Info    [facility] Emergency Dept Emergency Medicine Go to  For new or worsening symptoms [address] [facility] [place] [id number] [phone]    [facility] -  Schedule an appointment as soon as possible for a visit   [address] [place] [address]  [phone]               Debora Aguirre PA-C  09/05/24 5239

## 2024-10-09 ENCOUNTER — HOSPITAL ENCOUNTER (EMERGENCY)
Facility: HOSPITAL | Age: 39
Discharge: HOME | End: 2024-10-09
Attending: STUDENT IN AN ORGANIZED HEALTH CARE EDUCATION/TRAINING PROGRAM

## 2024-10-09 VITALS
WEIGHT: 170 LBS | DIASTOLIC BLOOD PRESSURE: 85 MMHG | TEMPERATURE: 99 F | RESPIRATION RATE: 18 BRPM | BODY MASS INDEX: 24.39 KG/M2 | HEART RATE: 80 BPM | SYSTOLIC BLOOD PRESSURE: 145 MMHG | OXYGEN SATURATION: 98 %

## 2024-10-09 DIAGNOSIS — M25.569 KNEE PAIN: ICD-10-CM

## 2024-10-09 DIAGNOSIS — M25.469 KNEE SWELLING: Primary | ICD-10-CM

## 2024-10-09 PROCEDURE — 99283 EMERGENCY DEPT VISIT LOW MDM: CPT | Mod: 25

## 2024-10-09 PROCEDURE — 25000003 PHARM REV CODE 250: Performed by: PHYSICIAN ASSISTANT

## 2024-10-09 RX ORDER — ACETAMINOPHEN 500 MG
500 TABLET ORAL EVERY 4 HOURS PRN
Qty: 20 TABLET | Refills: 0 | Status: SHIPPED | OUTPATIENT
Start: 2024-10-09 | End: 2024-10-14

## 2024-10-09 RX ORDER — IBUPROFEN 600 MG/1
600 TABLET ORAL
Status: COMPLETED | OUTPATIENT
Start: 2024-10-09 | End: 2024-10-09

## 2024-10-09 RX ORDER — OXYCODONE AND ACETAMINOPHEN 5; 325 MG/1; MG/1
1 TABLET ORAL
Status: COMPLETED | OUTPATIENT
Start: 2024-10-09 | End: 2024-10-09

## 2024-10-09 RX ORDER — IBUPROFEN 600 MG/1
600 TABLET ORAL EVERY 6 HOURS PRN
Qty: 20 TABLET | Refills: 0 | Status: SHIPPED | OUTPATIENT
Start: 2024-10-09 | End: 2024-10-14

## 2024-10-09 RX ADMIN — IBUPROFEN 600 MG: 600 TABLET ORAL at 04:10

## 2024-10-09 RX ADMIN — OXYCODONE HYDROCHLORIDE AND ACETAMINOPHEN 1 TABLET: 5; 325 TABLET ORAL at 04:10

## 2024-10-09 NOTE — Clinical Note
"Shlomo Paredess"Magdi was seen and treated in our emergency department on 10/9/2024.  He may return to work on 10/12/2024.       If you have any questions or concerns, please don't hesitate to call.      Sonia Soto PA-C"

## 2024-10-09 NOTE — DISCHARGE INSTRUCTIONS

## 2024-10-09 NOTE — ED PROVIDER NOTES
Encounter Date: 10/9/2024    SCRIBE #1 NOTE: I, Manuelito Lilliam, am scribing for, and in the presence of,  Sonia Soto PA-C.       History     Chief Complaint   Patient presents with    Knee Pain     Pt c/o right knee pain x 10 days. Pt denies any injury or trauma. Swelling noted to right knee. Pt does go to the gym      CC: Knee pain    HPI:  38 y.o. male with PMHx of DM, presents to the ED for evaluation of persistent R knee pain and swelling x 2 weeks. States that symptoms are exacerbated with walking and notes that symptoms are worse in the mornings. He also reports of lower back pain 2 days ago and doesn't believe that the pain is related to the knee pain. Denies any known injuries or trauma. Denies a hx of similar presentation. 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 Attempted treatment for symptoms with OTC medications, unsure which one, without relief. Denies history of DVT/PE in self or family, recent travel, trauma, surgery, history of CA, hormone therapy, CP, SOB, hemoptysis. Denies numbness, tingling, hip pain, bladder/bowel incontinence, saddle anesthesia, fever, chills, or any associated symptoms.     The history is provided by the patient. No  was used.     Review of patient's allergies indicates:  No Known Allergies  History reviewed. No pertinent past medical history.  History reviewed. No pertinent surgical history.  No family history on file.  Social History     Tobacco Use    Smoking status: Never    Smokeless tobacco: Never   Substance Use Topics    Alcohol use: Yes    Drug use: Never     Review of Systems   Constitutional:  Negative for chills and fever.   HENT:  Negative for congestion, ear pain, rhinorrhea and sore throat.    Eyes:  Negative for redness.   Respiratory:  Negative for shortness of breath and stridor.    Cardiovascular:  Negative for chest pain.   Gastrointestinal:   Negative for abdominal pain, constipation, diarrhea, nausea and vomiting.   Genitourinary:  Negative for dysuria, frequency, hematuria and urgency.   Musculoskeletal:  Positive for arthralgias, back pain and joint swelling. Negative for neck pain.   Skin:  Negative for rash.   Neurological:  Negative for dizziness, speech difficulty, weakness, light-headedness and numbness.   Hematological:  Does not bruise/bleed easily.   Psychiatric/Behavioral:  Negative for confusion.        Physical Exam     Initial Vitals [10/09/24 1541]   BP Pulse Resp Temp SpO2   (!) 145/85 80 18 98.5 °F (36.9 °C) 98 %      MAP       --         Physical Exam    Nursing note and vitals reviewed.  Constitutional: He appears well-developed and well-nourished. No distress.   HENT:   Head: Normocephalic.   Right Ear: External ear normal.   Left Ear: External ear normal.   Eyes: Conjunctivae are normal.   Pulmonary/Chest: No respiratory distress.   Musculoskeletal:      Right knee: Swelling present. No bony tenderness.      Comments: Pain with flexion of the R knee. No overlying erythema noted. No ligamentous laxity noted. 2+ DP pulses bilaterally. No sensory deficits.      Neurological: He is alert.   Skin: Skin is warm and dry. No rash noted.   Psychiatric: He has a normal mood and affect. His behavior is normal. Judgment and thought content normal.         ED Course   Procedures  Labs Reviewed - No data to display       Imaging Results              X-Ray Knee 3 View Right (Final result)  Result time 10/09/24 16:49:07      Final result by Regan Hopson MD (10/09/24 16:49:07)                   Impression:      1. No acute displaced fracture or dislocation of the knee noting there may be small suprapatellar effusion versus positioning.      Electronically signed by: Regan Hopson MD  Date:    10/09/2024  Time:    16:49               Narrative:    EXAMINATION:  XR KNEE 3 VIEW RIGHT    CLINICAL HISTORY:  Pain in unspecified  knee    TECHNIQUE:  AP, lateral, and Merchant views of the right knee were performed.    COMPARISON:  None    FINDINGS:  Three views right knee.    No acute displaced fracture or dislocation of the knee.  No radiopaque foreign body.  There may be a small suprapatellar effusion versus positioning.  Exostosis arises from the lateral aspect of the proximal medial tibia.                                       Medications   ibuprofen tablet 600 mg (600 mg Oral Given 10/9/24 1657)   oxyCODONE-acetaminophen 5-325 mg per tablet 1 tablet (1 tablet Oral Given 10/9/24 1657)     Medical Decision Making  38-YEAR-OLD MALE PRESENTING FOR EVALUATION OF ATRAUMATIC RIGHT KNEE PAIN AND SWELLING.  EXAM ABOVE.  X-RAY NEGATIVE FOR FRACTURE DISLOCATION.  RADIOLOGY REPORTS EXOSTOSIS ARISING FROM LATERAL ASPECT OF THE PROXIMAL MEDIAL TIBIA.  DISCUSSED FINDINGS WITH THE PATIENT.  WILL REFER TO ORTHOPEDICS FOR FURTHER EVALUATION MANAGEMENT.  IBUPROFEN PERCOCET GIVEN IN THE ED.  IT WAS WRAP APPLIED.  THINK THIS IS LIKELY KNEE SPRAIN/MUSCULOSKELETAL PAIN.  NO MEDIAL THIGH CALF OR POPLITEAL TENDERNESS.  NO RISK FACTORS FOR DVT OR PE.  WILL DISCHARGE WITH MEDICATIONS FOR SYMPTOMATIC TREATMENT.  FOLLOW UP WITH ORTHO AND RETURN ER FOR WORSENING OR AS NEEDED.    Amount and/or Complexity of Data Reviewed  Radiology: ordered. Decision-making details documented in ED Course.    Risk  OTC drugs.  Prescription drug management.            Scribe Attestation:   Scribe #1: I performed the above scribed service and the documentation accurately describes the services I performed. I attest to the accuracy of the note.                               I, Sonia Soto PA-C , personally performed the services described in this documentation. All medical record entries made by the scribe were at my direction and in my presence. I have reviewed the chart and agree that the record reflects my personal performance and is accurate and complete.      DISCLAIMER: This  note was prepared with [employer] voice recognition transcription software. Garbled syntax, mangled pronouns, and other bizarre constructions may be attributed to that software system.      Clinical Impression:  Final diagnoses:  [M25.569] Knee pain  [M25.469] Knee swelling (Primary)          ED Disposition Condition    Discharge Stable          ED Prescriptions       Medication Sig Dispense Start Date End Date Auth. Provider    ibuprofen (ADVIL,MOTRIN) 600 MG tablet Take 1 tablet (600 mg total) by mouth every 6 (six) hours as needed. 20 tablet 10/9/2024 10/14/2024 Sonia Soto PA-C    acetaminophen (TYLENOL) 500 MG tablet Take 1 tablet (500 mg total) by mouth every 4 (four) hours as needed. 20 tablet 10/9/2024 10/14/2024 Sonia Soto PA-C          Follow-up Information       Follow up With Specialties Details Why Contact Info    Your Primary Care Doctor  Schedule an appointment as soon as possible for a visit in 2 days      [facility] Emergency Dept Emergency Medicine Go to  As needed, If symptoms worsen [address] [facility] [place] [address] [phone]    Zacarias Oneal MD Orthopedic Surgery Schedule an appointment as soon as possible for a visit  for follow up with orthopedics [address]  [facility] [id number] [phone]      Konstantin Altman MD Orthopedic Surgery, Hand Surgery Schedule an appointment as soon as possible for a visit  for orthopedics follow up [address]  [facility] [address] [phone]      Jeb Bustamante MD Orthopedic Surgery Schedule an appointment as soon as possible for a visit in 2 days for follow up [address] [facility]  [address]  [place] [address] [phone]      Nel Jones MD Orthopedic Surgery Schedule an appointment as soon as possible for a visit in 2 days for orthopedics follow up [address] [facility] [place] [id number] [phone]      [facility] Emergency Dept Emergency  Medicine Go to  As needed, If symptoms worsen [address] [facility] [place] [address] [phone]             Sonia Soto PA-C  10/09/24 1911

## 2024-10-24 ENCOUNTER — HOSPITAL ENCOUNTER (EMERGENCY)
Facility: HOSPITAL | Age: 39
Discharge: HOME | End: 2024-10-24
Attending: EMERGENCY MEDICINE

## 2024-10-24 VITALS
BODY MASS INDEX: 24.77 KG/M2 | DIASTOLIC BLOOD PRESSURE: 78 MMHG | OXYGEN SATURATION: 97 % | SYSTOLIC BLOOD PRESSURE: 140 MMHG | WEIGHT: 173 LBS | HEIGHT: 70 IN | HEART RATE: 79 BPM | TEMPERATURE: 98 F | RESPIRATION RATE: 18 BRPM

## 2024-10-24 DIAGNOSIS — M25.561 ACUTE PAIN OF RIGHT KNEE: Primary | ICD-10-CM

## 2024-10-24 PROCEDURE — 99282 EMERGENCY DEPT VISIT SF MDM: CPT
